# Patient Record
Sex: MALE | Race: WHITE | Employment: FULL TIME | URBAN - METROPOLITAN AREA
[De-identification: names, ages, dates, MRNs, and addresses within clinical notes are randomized per-mention and may not be internally consistent; named-entity substitution may affect disease eponyms.]

---

## 2020-06-13 ENCOUNTER — HOSPITAL ENCOUNTER (OUTPATIENT)
Age: 45
Discharge: HOME | End: 2020-06-13
Payer: COMMERCIAL

## 2020-06-13 LAB
BASOPHILS ABSOLUTE: 0.1 K/UL (ref 0–0.2)
BASOPHILS RELATIVE PERCENT: 0.6 %
EOSINOPHILS ABSOLUTE: 0.3 K/UL (ref 0–0.6)
EOSINOPHILS RELATIVE PERCENT: 3.1 %
HCT VFR BLD CALC: 48 % (ref 40.5–52.5)
HEMOGLOBIN: 16.2 G/DL (ref 13.5–17.5)
LYMPHOCYTES ABSOLUTE: 3.3 K/UL (ref 1–5.1)
LYMPHOCYTES RELATIVE PERCENT: 37.4 %
MCH RBC QN AUTO: 30.4 PG (ref 26–34)
MCHC RBC AUTO-ENTMCNC: 33.7 G/DL (ref 31–36)
MCV RBC AUTO: 90.1 FL (ref 80–100)
MONOCYTES ABSOLUTE: 0.6 K/UL (ref 0–1.3)
MONOCYTES RELATIVE PERCENT: 6.7 %
NEUTROPHILS ABSOLUTE: 4.7 K/UL (ref 1.7–7.7)
NEUTROPHILS RELATIVE PERCENT: 52.2 %
PDW BLD-RTO: 13.6 % (ref 12.4–15.4)
PLATELET # BLD: 173 K/UL (ref 135–450)
PMV BLD AUTO: 9.9 FL (ref 5–10.5)
RBC # BLD: 5.32 M/UL (ref 4.2–5.9)
WBC # BLD: 8.9 K/UL (ref 4–11)

## 2020-06-13 PROCEDURE — 83520 IMMUNOASSAY QUANT NOS NONAB: CPT

## 2020-06-13 PROCEDURE — 85025 COMPLETE CBC W/AUTO DIFF WBC: CPT

## 2020-06-13 PROCEDURE — 36415 COLL VENOUS BLD VENIPUNCTURE: CPT

## 2020-06-16 LAB — TRYPTASE: 10.1 UG/L

## 2021-11-02 ENCOUNTER — OFFICE VISIT (OUTPATIENT)
Dept: ORTHOPEDIC SURGERY | Age: 46
End: 2021-11-02
Payer: COMMERCIAL

## 2021-11-02 VITALS — HEIGHT: 70 IN | BODY MASS INDEX: 3.58 KG/M2 | WEIGHT: 25 LBS

## 2021-11-02 DIAGNOSIS — M24.522 FLEXION CONTRACTURE OF LEFT ELBOW: ICD-10-CM

## 2021-11-02 DIAGNOSIS — S53.409A ELBOW SPRAIN, INITIAL ENCOUNTER: ICD-10-CM

## 2021-11-02 DIAGNOSIS — Z88.6 ALLERGY TO NSAIDS: ICD-10-CM

## 2021-11-02 DIAGNOSIS — M25.522 ELBOW PAIN, LEFT: ICD-10-CM

## 2021-11-02 DIAGNOSIS — M19.022 PRIMARY OSTEOARTHRITIS, LEFT ELBOW: ICD-10-CM

## 2021-11-02 PROCEDURE — 99203 OFFICE O/P NEW LOW 30 MIN: CPT | Performed by: INTERNAL MEDICINE

## 2021-11-02 NOTE — PROGRESS NOTES
Chief Complaint:   Chief Complaint   Patient presents with    Elbow Injury     L elbow sports injury 3 wks ago, better with rest, decreased ROM          History of Present Illness:       Patient is a 51 y.o. male presents with the above complaint. The symptoms began 3 weeksago and started with an injury. The patient describes a aching, tightness pain that does not radiate. The symptoms are constant  and are are worsening since the onset. He noticed the onset of symptoms after grappling recreationally. His arm was in a abducted position with the elbow flexed and he was resisting valgus moment/force about the elbow and felt discomfort related to this. Pain is diffuse about the elbow and  does not seem to follow a typical epicondylitis provacative pattern. There are not associated mechanical symptoms. There are not neuritic symptoms about the elbow. The patient denies subjective instability about the elbow and denies new onset or progressive weakness of the upper extremity. Pain level 5    The patient denies a pattern of activity related swelling. Treatment to date: Local measures    There is nono prior history of elbow trauma. There is no prior history of autoimmune disease, crystal arthropathy, or crystal arthropathy. Past Medical History:      History reviewed. No pertinent past medical history. History reviewed. No pertinent surgical history. Present Medications:         No current outpatient medications on file. No current facility-administered medications for this visit. Allergies:         Allergies   Allergen Reactions    Antihistamines, Chlorpheniramine-Type     Aspirin     Ibuprofen         Social History:         Social History     Socioeconomic History    Marital status:      Spouse name: Not on file    Number of children: Not on file    Years of education: Not on file    Highest education level: Not on file   Occupational History    Not on file   Tobacco Use    Smoking status: Current Every Day Smoker     Packs/day: 0.50     Years: 31.00     Pack years: 15.50     Types: Cigarettes    Smokeless tobacco: Never Used   Substance and Sexual Activity    Alcohol use: Not on file    Drug use: Not on file    Sexual activity: Not on file   Other Topics Concern    Not on file   Social History Narrative    Not on file     Social Determinants of Health     Financial Resource Strain:     Difficulty of Paying Living Expenses:    Food Insecurity:     Worried About Running Out of Food in the Last Year:     Ran Out of Food in the Last Year:    Transportation Needs:     Lack of Transportation (Medical):  Lack of Transportation (Non-Medical):    Physical Activity:     Days of Exercise per Week:     Minutes of Exercise per Session:    Stress:     Feeling of Stress :    Social Connections:     Frequency of Communication with Friends and Family:     Frequency of Social Gatherings with Friends and Family:     Attends Voodoo Services:     Active Member of Clubs or Organizations:     Attends Club or Organization Meetings:     Marital Status:    Intimate Partner Violence:     Fear of Current or Ex-Partner:     Emotionally Abused:     Physically Abused:     Sexually Abused:         Review of Symptoms:    Pertinent items are noted in HPI    Review of systems reviewed from Patient History Form dated on today's date and   available in the patient's chart under the Media tab. Vital Signs: There were no vitals filed for this visit. General Exam:     Constitutional: Patient is adequately groomed with no evidence of malnutrition  Mental Status: The patient is oriented to time, place and person. The patient's mood and affect are appropriate. Vascular: Examination reveals no swelling or calf tenderness. Peripheral pulses are palpable and 2+.     Lymphatics: no lymphadenopathy of the cervical or axillary regions or upper extremity      Physical Exam: left elbow      Primary Exam:    Inspection: No deformity atrophy appreciable effusion      Palpation: No focal tenderness      Range of Motion: 2/130 subjective tightness endrange extension greater than flexion      Strength: Normal at the elbow and flexor pronator extensor supinator mass      Special Tests: Ulnar collateral ligament stressing stable, modified liftoff minimally positive for pain      Skin: There are no rashes, ulcerations or lesions. Gait: Nonantalgic      Reflex intact upper     Additional Comments:        Additional Examinations:           Right Upper Extremity:  Examination of the right upper extremity does not show any tenderness, deformity or injury. Range of motion is unremarkable. There is no gross instability. There are no rashes, ulcerations or lesions. Strength and tone are normal.   Neurologic -Light touch sensation and manual muscle testing is normal C5-C8 . Biceps and triceps reflexes are symmetric and +2. Spurlling sign is negative         Office Imaging Results/Procedures PerformedToday:     X-rays 3 view left elbow  There is evidence of low-grade degenerative change with relative joint space preservation affecting the ulnohumeral articulation medially and posteriorly. The radiocapitellar joint is congruent no other soft tissue or osseous abnormalities        Office Procedures:     Orders Placed This Encounter   Procedures    XR ELBOW LEFT (MIN 3 VIEWS)     Standing Status:   Future     Number of Occurrences:   1     Standing Expiration Date:   11/2/2022     Order Specific Question:   Reason for exam:     Answer:   elbow pain           Other Outside Imaging and Testing Personally Reviewed:    No results found. Assessment   Impression: . Encounter Diagnoses   Name Primary?     Elbow sprain, initial encounter     Primary osteoarthritis, left elbow     Flexion contracture of left elbow     Allergy to NSAIDs     Elbow pain, left         Strict avoidance      Plan:       Elastic compression stocking with ADLs and as needed with recreational activity  Strict avoidance of NSAIDs. Use of Tylenol and recommend cryotherapy  Intra-articular ultrasound-guided steroid injection only for escalating pain levels  MRI evaluation left elbow as needed consider biologic orthopedic injection and/or HA injection left elbow. Full flexibility program/joint flexibility and tendon flexibility program demonstrated in the office  Avoid close chain activities as relates to weightbearing for at least 1 more week      The nature of the finding, probable diagnosis and likely treatment was thoroughly discussed with the patient. The options, risks, complications, alternative treatment as well as some of the differential diagnosis was discussed. The patient was thoroughly informed and all questions were answered. the patient indicated understanding and satisfaction with the discussion. Orders:        Orders Placed This Encounter   Procedures    XR ELBOW LEFT (MIN 3 VIEWS)     Standing Status:   Future     Number of Occurrences:   1     Standing Expiration Date:   11/2/2022     Order Specific Question:   Reason for exam:     Answer:   elbow pain           Disclaimer: \"This note was dictated with voice recognition software. Though review and correction are routine, we apologize for any errors. \"

## 2022-01-10 ENCOUNTER — HOSPITAL ENCOUNTER (OUTPATIENT)
Age: 47
Discharge: HOME | End: 2022-01-10
Payer: COMMERCIAL

## 2022-01-10 ENCOUNTER — HOSPITAL ENCOUNTER (OUTPATIENT)
Dept: GENERAL RADIOLOGY | Age: 47
Discharge: HOME | End: 2022-01-10
Payer: COMMERCIAL

## 2022-01-10 DIAGNOSIS — M54.12 CERVICAL RADICULITIS: ICD-10-CM

## 2022-01-10 PROCEDURE — 72040 X-RAY EXAM NECK SPINE 2-3 VW: CPT

## 2022-01-21 ENCOUNTER — HOSPITAL ENCOUNTER (OUTPATIENT)
Dept: PHYSICAL THERAPY | Age: 47
Setting detail: THERAPIES SERIES
Discharge: HOME | End: 2022-01-21

## 2024-09-30 ENCOUNTER — WALK IN (OUTPATIENT)
Dept: URGENT CARE | Age: 49
End: 2024-09-30

## 2024-09-30 VITALS
BODY MASS INDEX: 33.77 KG/M2 | SYSTOLIC BLOOD PRESSURE: 126 MMHG | TEMPERATURE: 98 F | DIASTOLIC BLOOD PRESSURE: 70 MMHG | OXYGEN SATURATION: 99 % | HEIGHT: 70 IN | RESPIRATION RATE: 14 BRPM | HEART RATE: 80 BPM | WEIGHT: 235.9 LBS

## 2024-09-30 DIAGNOSIS — R11.0 NAUSEA: Primary | ICD-10-CM

## 2024-09-30 DIAGNOSIS — R42 DIZZINESS: ICD-10-CM

## 2024-09-30 LAB — GLUCOSE BLD-MCNC: 131 MG/DL (ref 65–99)

## 2024-09-30 PROCEDURE — 99203 OFFICE O/P NEW LOW 30 MIN: CPT | Performed by: NURSE PRACTITIONER

## 2024-09-30 PROCEDURE — 82962 GLUCOSE BLOOD TEST: CPT | Performed by: NURSE PRACTITIONER

## 2024-09-30 ASSESSMENT — ENCOUNTER SYMPTOMS
NUMBNESS: 0
FEVER: 0
CONFUSION: 0
ABDOMINAL PAIN: 0
HEADACHES: 0
WEAKNESS: 0
ACTIVITY CHANGE: 0
RESPIRATORY NEGATIVE: 1
DIZZINESS: 1
FACIAL ASYMMETRY: 0
NAUSEA: 1
BLOOD IN STOOL: 0
CHILLS: 0
DIARRHEA: 0
RHINORRHEA: 0
VOMITING: 0
LIGHT-HEADEDNESS: 0
SPEECH DIFFICULTY: 0
SORE THROAT: 0
SEIZURES: 0

## 2024-10-03 ENCOUNTER — APPOINTMENT (OUTPATIENT)
Dept: OCCUPATIONAL MEDICINE | Age: 49
End: 2024-10-03

## 2024-10-07 ENCOUNTER — APPOINTMENT (OUTPATIENT)
Dept: OCCUPATIONAL MEDICINE | Age: 49
End: 2024-10-07

## 2024-10-07 VITALS
HEIGHT: 71 IN | HEART RATE: 68 BPM | WEIGHT: 232 LBS | DIASTOLIC BLOOD PRESSURE: 100 MMHG | BODY MASS INDEX: 32.48 KG/M2 | SYSTOLIC BLOOD PRESSURE: 152 MMHG

## 2024-10-07 DIAGNOSIS — Z02.89 ENCOUNTER FOR OCCUPATIONAL HEALTH EXAMINATION: Primary | ICD-10-CM

## 2024-10-07 LAB
APPEARANCE UR: CLEAR
BILIRUB UR QL STRIP: NEGATIVE MG/DL
COLOR UR: YELLOW
GLUCOSE UR-SCNC: NEGATIVE MMOL/L
KETONES UR QL STRIP: NEGATIVE MMOL/L
NITRITE UR STRIP-MCNC: NEGATIVE MG/DL
PH UR: 5 [PH]
PROT UR STRIP-MCNC: NEGATIVE G/L
RBC # UR STRIP: NEGATIVE ERY/UL
SP GR UR: 1.03
UROBILINOGEN UR-MCNC: 0.2 MG/DL
WBC # UR: NEGATIVE LEU/UL

## 2024-10-07 PROCEDURE — OH053 WHISPER TEST PERFORMED IN OCC HEALTH: Performed by: PREVENTIVE MEDICINE

## 2024-10-07 PROCEDURE — OH048 DOT VISION: Performed by: PREVENTIVE MEDICINE

## 2024-10-07 PROCEDURE — OH023 DOT/CDL PHYSICAL EXAM: Performed by: PREVENTIVE MEDICINE

## 2025-08-15 ENCOUNTER — OFFICE VISIT (OUTPATIENT)
Dept: OCCUPATIONAL MEDICINE | Age: 50
End: 2025-08-15

## 2025-08-15 VITALS
HEIGHT: 72 IN | BODY MASS INDEX: 31.97 KG/M2 | WEIGHT: 236 LBS | HEART RATE: 68 BPM | DIASTOLIC BLOOD PRESSURE: 90 MMHG | SYSTOLIC BLOOD PRESSURE: 142 MMHG

## 2025-08-15 DIAGNOSIS — Z02.89 ENCOUNTER FOR OCCUPATIONAL HEALTH EXAMINATION: Primary | ICD-10-CM

## 2025-08-15 LAB
APPEARANCE UR: CLEAR
BILIRUB UR QL STRIP: NEGATIVE MG/DL
COLOR UR: YELLOW
GLUCOSE UR-SCNC: NEGATIVE MMOL/L
KETONES UR QL STRIP: NEGATIVE MMOL/L
NITRITE UR STRIP-MCNC: NEGATIVE MG/DL
PH UR: 6 [PH]
PROT UR STRIP-MCNC: NORMAL G/L
RBC # UR STRIP: NEGATIVE ERY/UL
SP GR UR: 1.02
UROBILINOGEN UR-MCNC: 0.2 MG/DL
WBC # UR: NEGATIVE LEU/UL

## 2025-08-15 PROCEDURE — OH023 DOT/CDL PHYSICAL EXAM: Performed by: PREVENTIVE MEDICINE

## 2025-08-15 PROCEDURE — OH053 WHISPER TEST PERFORMED IN OCC HEALTH: Performed by: PREVENTIVE MEDICINE

## 2025-08-15 PROCEDURE — OH048 DOT VISION: Performed by: PREVENTIVE MEDICINE
